# Patient Record
Sex: FEMALE | Race: WHITE | NOT HISPANIC OR LATINO | Employment: OTHER | URBAN - METROPOLITAN AREA
[De-identification: names, ages, dates, MRNs, and addresses within clinical notes are randomized per-mention and may not be internally consistent; named-entity substitution may affect disease eponyms.]

---

## 2020-10-30 ENCOUNTER — HOSPITAL ENCOUNTER (OUTPATIENT)
Dept: GASTROENTEROLOGY | Facility: HOSPITAL | Age: 65
Setting detail: OUTPATIENT SURGERY
Discharge: HOME | End: 2020-10-30
Attending: COLON & RECTAL SURGERY

## 2022-04-09 ENCOUNTER — APPOINTMENT (EMERGENCY)
Dept: RADIOLOGY | Facility: HOSPITAL | Age: 67
End: 2022-04-09
Payer: COMMERCIAL

## 2022-04-09 ENCOUNTER — HOSPITAL ENCOUNTER (EMERGENCY)
Facility: HOSPITAL | Age: 67
Discharge: LEFT BEFORE DISCHARGE | End: 2022-04-09
Attending: EMERGENCY MEDICINE | Admitting: EMERGENCY MEDICINE
Payer: COMMERCIAL

## 2022-04-09 VITALS
OXYGEN SATURATION: 93 % | RESPIRATION RATE: 23 BRPM | DIASTOLIC BLOOD PRESSURE: 82 MMHG | HEART RATE: 62 BPM | TEMPERATURE: 98.8 F | WEIGHT: 142 LBS | SYSTOLIC BLOOD PRESSURE: 171 MMHG

## 2022-04-09 DIAGNOSIS — R51.9 HEADACHE: ICD-10-CM

## 2022-04-09 DIAGNOSIS — R11.0 NAUSEA: Primary | ICD-10-CM

## 2022-04-09 DIAGNOSIS — R09.02 HYPOXIA: ICD-10-CM

## 2022-04-09 DIAGNOSIS — Z53.29 LEFT AGAINST MEDICAL ADVICE: ICD-10-CM

## 2022-04-09 DIAGNOSIS — J18.9 PNEUMONIA: ICD-10-CM

## 2022-04-09 LAB
ALBUMIN SERPL BCP-MCNC: 3.7 G/DL (ref 3.5–5)
ALP SERPL-CCNC: 93 U/L (ref 46–116)
ALT SERPL W P-5'-P-CCNC: 13 U/L (ref 12–78)
ANION GAP SERPL CALCULATED.3IONS-SCNC: 4 MMOL/L (ref 4–13)
AST SERPL W P-5'-P-CCNC: 15 U/L (ref 5–45)
ATRIAL RATE: 65 BPM
BACTERIA UR QL AUTO: NORMAL /HPF
BASOPHILS # BLD MANUAL: 0.13 THOUSAND/UL (ref 0–0.1)
BASOPHILS NFR MAR MANUAL: 1 % (ref 0–1)
BILIRUB SERPL-MCNC: 0.38 MG/DL (ref 0.2–1)
BILIRUB UR QL STRIP: NEGATIVE
BUN SERPL-MCNC: 28 MG/DL (ref 5–25)
CALCIUM SERPL-MCNC: 9.3 MG/DL (ref 8.3–10.1)
CHLORIDE SERPL-SCNC: 104 MMOL/L (ref 100–108)
CLARITY UR: CLEAR
CO2 SERPL-SCNC: 28 MMOL/L (ref 21–32)
COLOR UR: YELLOW
CREAT SERPL-MCNC: 1.56 MG/DL (ref 0.6–1.3)
D DIMER PPP FEU-MCNC: 2.97 UG/ML FEU
EOSINOPHIL # BLD MANUAL: 0 THOUSAND/UL (ref 0–0.4)
EOSINOPHIL NFR BLD MANUAL: 0 % (ref 0–6)
ERYTHROCYTE [DISTWIDTH] IN BLOOD BY AUTOMATED COUNT: 15 % (ref 11.6–15.1)
FLUAV RNA RESP QL NAA+PROBE: NEGATIVE
FLUBV RNA RESP QL NAA+PROBE: NEGATIVE
GFR SERPL CREATININE-BSD FRML MDRD: 33 ML/MIN/1.73SQ M
GLUCOSE SERPL-MCNC: 132 MG/DL (ref 65–140)
GLUCOSE UR STRIP-MCNC: NEGATIVE MG/DL
HCT VFR BLD AUTO: 46 % (ref 34.8–46.1)
HGB BLD-MCNC: 15.1 G/DL (ref 11.5–15.4)
HGB UR QL STRIP.AUTO: ABNORMAL
KETONES UR STRIP-MCNC: NEGATIVE MG/DL
LEUKOCYTE ESTERASE UR QL STRIP: NEGATIVE
LG PLATELETS BLD QL SMEAR: PRESENT
LIPASE SERPL-CCNC: 333 U/L (ref 73–393)
LYMPHOCYTES # BLD AUTO: 28 % (ref 14–44)
LYMPHOCYTES # BLD AUTO: 3.6 THOUSAND/UL (ref 0.6–4.47)
MCH RBC QN AUTO: 32.1 PG (ref 26.8–34.3)
MCHC RBC AUTO-ENTMCNC: 32.8 G/DL (ref 31.4–37.4)
MCV RBC AUTO: 98 FL (ref 82–98)
MONOCYTES # BLD AUTO: 0.51 THOUSAND/UL (ref 0–1.22)
MONOCYTES NFR BLD: 4 % (ref 4–12)
NEUTROPHILS # BLD MANUAL: 8.61 THOUSAND/UL (ref 1.85–7.62)
NEUTS BAND NFR BLD MANUAL: 1 % (ref 0–8)
NEUTS SEG NFR BLD AUTO: 66 % (ref 43–75)
NITRITE UR QL STRIP: NEGATIVE
NON-SQ EPI CELLS URNS QL MICRO: NORMAL /HPF
NT-PROBNP SERPL-MCNC: 2188 PG/ML
P AXIS: 85 DEGREES
PH UR STRIP.AUTO: 5.5 [PH] (ref 4.5–8)
PLATELET # BLD AUTO: 196 THOUSANDS/UL (ref 149–390)
PLATELET BLD QL SMEAR: ADEQUATE
PMV BLD AUTO: 10.9 FL (ref 8.9–12.7)
POIKILOCYTOSIS BLD QL SMEAR: PRESENT
POTASSIUM SERPL-SCNC: 4.8 MMOL/L (ref 3.5–5.3)
PR INTERVAL: 180 MS
PROT SERPL-MCNC: 8.1 G/DL (ref 6.4–8.2)
PROT UR STRIP-MCNC: ABNORMAL MG/DL
QRS AXIS: 75 DEGREES
QRSD INTERVAL: 80 MS
QT INTERVAL: 388 MS
QTC INTERVAL: 403 MS
RBC # BLD AUTO: 4.7 MILLION/UL (ref 3.81–5.12)
RBC #/AREA URNS AUTO: NORMAL /HPF
RSV RNA RESP QL NAA+PROBE: NEGATIVE
SARS-COV-2 RNA RESP QL NAA+PROBE: NEGATIVE
SODIUM SERPL-SCNC: 136 MMOL/L (ref 136–145)
SP GR UR STRIP.AUTO: >=1.03 (ref 1–1.03)
T WAVE AXIS: 84 DEGREES
TARGETS BLD QL SMEAR: PRESENT
UROBILINOGEN UR QL STRIP.AUTO: 0.2 E.U./DL
VENTRICULAR RATE: 65 BPM
WBC # BLD AUTO: 12.85 THOUSAND/UL (ref 4.31–10.16)
WBC #/AREA URNS AUTO: NORMAL /HPF

## 2022-04-09 PROCEDURE — 83690 ASSAY OF LIPASE: CPT | Performed by: EMERGENCY MEDICINE

## 2022-04-09 PROCEDURE — 70450 CT HEAD/BRAIN W/O DYE: CPT

## 2022-04-09 PROCEDURE — 85007 BL SMEAR W/DIFF WBC COUNT: CPT | Performed by: EMERGENCY MEDICINE

## 2022-04-09 PROCEDURE — 0241U HB NFCT DS VIR RESP RNA 4 TRGT: CPT | Performed by: EMERGENCY MEDICINE

## 2022-04-09 PROCEDURE — 71250 CT THORAX DX C-: CPT

## 2022-04-09 PROCEDURE — 93010 ELECTROCARDIOGRAM REPORT: CPT | Performed by: INTERNAL MEDICINE

## 2022-04-09 PROCEDURE — 85027 COMPLETE CBC AUTOMATED: CPT | Performed by: EMERGENCY MEDICINE

## 2022-04-09 PROCEDURE — 99284 EMERGENCY DEPT VISIT MOD MDM: CPT | Performed by: EMERGENCY MEDICINE

## 2022-04-09 PROCEDURE — 74176 CT ABD & PELVIS W/O CONTRAST: CPT

## 2022-04-09 PROCEDURE — 96361 HYDRATE IV INFUSION ADD-ON: CPT

## 2022-04-09 PROCEDURE — 36415 COLL VENOUS BLD VENIPUNCTURE: CPT | Performed by: EMERGENCY MEDICINE

## 2022-04-09 PROCEDURE — 83880 ASSAY OF NATRIURETIC PEPTIDE: CPT | Performed by: EMERGENCY MEDICINE

## 2022-04-09 PROCEDURE — 85379 FIBRIN DEGRADATION QUANT: CPT | Performed by: EMERGENCY MEDICINE

## 2022-04-09 PROCEDURE — 96374 THER/PROPH/DIAG INJ IV PUSH: CPT

## 2022-04-09 PROCEDURE — 99284 EMERGENCY DEPT VISIT MOD MDM: CPT

## 2022-04-09 PROCEDURE — 81001 URINALYSIS AUTO W/SCOPE: CPT

## 2022-04-09 PROCEDURE — 71045 X-RAY EXAM CHEST 1 VIEW: CPT

## 2022-04-09 PROCEDURE — G1004 CDSM NDSC: HCPCS

## 2022-04-09 PROCEDURE — 93005 ELECTROCARDIOGRAM TRACING: CPT

## 2022-04-09 PROCEDURE — 80053 COMPREHEN METABOLIC PANEL: CPT | Performed by: EMERGENCY MEDICINE

## 2022-04-09 RX ORDER — ONDANSETRON 2 MG/ML
4 INJECTION INTRAMUSCULAR; INTRAVENOUS ONCE
Status: COMPLETED | OUTPATIENT
Start: 2022-04-09 | End: 2022-04-09

## 2022-04-09 RX ADMIN — ONDANSETRON 4 MG: 2 INJECTION INTRAMUSCULAR; INTRAVENOUS at 12:00

## 2022-04-09 RX ADMIN — SODIUM CHLORIDE 1000 ML: 0.9 INJECTION, SOLUTION INTRAVENOUS at 12:00

## 2022-04-09 NOTE — ED NOTES
Patient requesting IV out at this time  Dr Mary Omer aware and at patient beside        Barrett Horner RN  04/09/22 1780

## 2022-04-09 NOTE — ED PROVIDER NOTES
History  Chief Complaint   Patient presents with    Medical Problem     Per pt "I don't feel well"  Pt unable to describe symptoms  Per pt started last night  49-year-old female with history of PE on Eliquis who presents for evaluation of headache, vomiting, and abdominal pain  Patient is a poor historian and is unable to tell me when her symptoms started but she believes sometime last night  She notes that she has had a diffuse headache  She has had nausea with multiple episodes of nonbloody nonbilious vomiting, although she notes her emesis was mostly mucus  She complains of generalized abdominal pain as well  She denies any surgeries on her abdomen  She denies fevers, chest pain, shortness of breath  She has a chronic cough which is unchanged  She did not take any medications prior to arrival           None       History reviewed  No pertinent past medical history  History reviewed  No pertinent surgical history  History reviewed  No pertinent family history  I have reviewed and agree with the history as documented  E-Cigarette/Vaping     E-Cigarette/Vaping Substances     Social History     Tobacco Use    Smoking status: Current Every Day Smoker    Smokeless tobacco: Never Used   Substance Use Topics    Alcohol use: Not Currently    Drug use: Not Currently        Review of Systems   Constitutional: Negative for chills and fever  Eyes: Negative for visual disturbance  Respiratory: Positive for cough (Chronic)  Negative for chest tightness and shortness of breath  Cardiovascular: Negative for chest pain and leg swelling  Gastrointestinal: Positive for abdominal pain, nausea and vomiting  Negative for abdominal distention, blood in stool, constipation and diarrhea  Genitourinary: Negative for dysuria, flank pain, frequency and urgency  Musculoskeletal: Negative for back pain and gait problem  Skin: Negative for pallor and rash  Neurological: Positive for headaches  Negative for syncope, weakness and light-headedness  All other systems reviewed and are negative  Physical Exam  ED Triage Vitals [04/09/22 1104]   Temperature Pulse Respirations Blood Pressure SpO2   98 8 °F (37 1 °C) 72 (!) 24 (!) 171/82 (!) 87 %      Temp Source Heart Rate Source Patient Position - Orthostatic VS BP Location FiO2 (%)   Oral Monitor Lying Left arm --      Pain Score       10 - Worst Possible Pain             Orthostatic Vital Signs  Vitals:    04/09/22 1104 04/09/22 1315   BP: (!) 171/82    Pulse: 72 62   Patient Position - Orthostatic VS: Lying        Physical Exam  Vitals and nursing note reviewed  Constitutional:       Appearance: She is well-developed  She is not ill-appearing  Comments: Patient intermittently vomiting  HENT:      Head: Normocephalic and atraumatic  Nose: Nose normal       Mouth/Throat:      Mouth: Mucous membranes are dry  Pharynx: No oropharyngeal exudate or posterior oropharyngeal erythema  Eyes:      Extraocular Movements: Extraocular movements intact  Pupils: Pupils are equal, round, and reactive to light  Cardiovascular:      Rate and Rhythm: Normal rate and regular rhythm  Heart sounds: No murmur heard  No friction rub  No gallop  Pulmonary:      Effort: Pulmonary effort is normal       Breath sounds: Normal breath sounds  No wheezing, rhonchi or rales  Abdominal:      General: There is no distension  Palpations: Abdomen is soft  Tenderness: There is abdominal tenderness in the suprapubic area and left upper quadrant  There is no right CVA tenderness, left CVA tenderness, guarding or rebound  Musculoskeletal:         General: No swelling or tenderness  Normal range of motion  Cervical back: Normal range of motion and neck supple  Skin:     General: Skin is warm and dry  Coloration: Skin is not pale  Findings: No rash  Neurological:      General: No focal deficit present        Mental Status: She is alert and oriented to person, place, and time  Psychiatric:         Behavior: Behavior normal          ED Medications  Medications   sodium chloride 0 9 % bolus 1,000 mL (0 mL Intravenous Stopped 4/9/22 1300)   ondansetron (ZOFRAN) injection 4 mg (4 mg Intravenous Given 4/9/22 1200)       Diagnostic Studies  Results Reviewed     Procedure Component Value Units Date/Time    CBC and differential [[id number]]  (Abnormal) Collected: 04/09/22 1159    Lab Status: Final result Specimen: Blood from Arm, Left Updated: 04/09/22 1521     WBC 12 85 Thousand/uL      RBC 4 70 Million/uL      Hemoglobin 15 1 g/dL      Hematocrit 46 0 %      MCV 98 fL      MCH 32 1 pg      MCHC 32 8 g/dL      RDW 15 0 %      MPV 10 9 fL      Platelets 351 Thousands/uL     Narrative: This is an appended report  These results have been appended to a previously verified report  Manual Differential(PHLEBS Do Not Order) [[id number]]  (Abnormal) Collected: 04/09/22 1159    Lab Status: Final result Specimen: Blood from Arm, Left Updated: 04/09/22 1521     Segmented % 66 %      Bands % 1 %      Lymphocytes % 28 %      Monocytes % 4 %      Eosinophils, % 0 %      Basophils % 1 %      Absolute Neutrophils 8 61 Thousand/uL      Lymphocytes Absolute 3 60 Thousand/uL      Monocytes Absolute 0 51 Thousand/uL      Eosinophils Absolute 0 00 Thousand/uL      Basophils Absolute 0 13 Thousand/uL      Total Counted --     Poikilocytes Present     Target Cells Present     Platelet Estimate Adequate     Large Platelet Present    COVID/FLU/RSV - 2 hour TAT [[id number]]  (Normal) Collected: 04/09/22 1159    Lab Status: Final result Specimen: Nares from Nose Updated: 04/09/22 1256     SARS-CoV-2 Negative     INFLUENZA A PCR Negative     INFLUENZA B PCR Negative     RSV PCR Negative    Narrative:      FOR PEDIATRIC PATIENTS - copy/paste COVID Guidelines URL to browser: [email or web address]    SARS-CoV-2 assay is a Nucleic Acid Amplification assay intended for the  qualitative detection of nucleic acid from SARS-CoV-2 in nasopharyngeal  swabs  Results are for the presumptive identification of SARS-CoV-2 RNA  Positive results are indicative of infection with SARS-CoV-2, the virus  causing COVID-19, but do not rule out bacterial infection or co-infection  with other viruses  Laboratories within the [place] and its  territories are required to report all positive results to the appropriate  public health authorities  Negative results do not preclude SARS-CoV-2  infection and should not be used as the sole basis for treatment or other  patient management decisions  Negative results must be combined with  clinical observations, patient history, and epidemiological information  This test has not been FDA cleared or approved  This test has been authorized by FDA under an Emergency Use Authorization  (EUA)  This test is only authorized for the duration of time the  declaration that circumstances exist justifying the authorization of the  emergency use of an in vitro diagnostic tests for detection of SARS-CoV-2  virus and/or diagnosis of COVID-19 infection under section 564(b)(1) of  the Act, 21 U  S C  281MRA-2(O)(1), unless the authorization is terminated  or revoked sooner  The test has been validated but independent review by FDA  and CLIA is pending  Test performed using [employer]: This RT-PCR assay targets N2,  a region unique to SARS-CoV-2  A conserved region in the E-gene was chosen  for pan-Sarbecovirus detection which includes SARS-CoV-2      Urine Microscopic [[id number]]  (Normal) Collected: 04/09/22 1213    Lab Status: Final result Specimen: Urine, Clean Catch Updated: 04/09/22 1251     RBC, UA 1-2 /hpf      WBC, UA 1-2 /hpf      Epithelial Cells Occasional /hpf      Bacteria, UA None Seen /hpf     D-Dimer [[id number]]  (Abnormal) Collected: 04/09/22 1159    Lab Status: Final result Specimen: Blood from Arm, Left Updated: 04/09/22 1236     D-Dimer, Quant 2 97 ug/ml FEU     Narrative: In the evaluation for possible pulmonary embolism, in the appropriate (Well's Score of 4 or less) patient, the age adjusted d-dimer cutoff for this patient can be calculated as:    Age x 0 01 (in ug/mL) for Age-adjusted D-dimer exclusion threshold for a patient over 50 years      NT-BNP PRO [[id number]]  (Abnormal) Collected: 04/09/22 1159    Lab Status: Final result Specimen: Blood from Arm, Left Updated: 04/09/22 1229     NT-proBNP 2,188 pg/mL     Comprehensive metabolic panel [[id number]]  (Abnormal) Collected: 04/09/22 1159    Lab Status: Final result Specimen: Blood from Arm, Left Updated: 04/09/22 1228     Sodium 136 mmol/L      Potassium 4 8 mmol/L      Chloride 104 mmol/L      CO2 28 mmol/L      ANION GAP 4 mmol/L      BUN 28 mg/dL      Creatinine 1 56 mg/dL      Glucose 132 mg/dL      Calcium 9 3 mg/dL      AST 15 U/L      ALT 13 U/L      Alkaline Phosphatase 93 U/L      Total Protein 8 1 g/dL      Albumin 3 7 g/dL      Total Bilirubin 0 38 mg/dL      eGFR 33 ml/min/1 73sq m     Narrative:      [place] guidelines for Chronic Kidney Disease (CKD):     Stage 1 with normal or high GFR (GFR > 90 mL/min/1 73 square meters)    Stage 2 Mild CKD (GFR = 60-89 mL/min/1 73 square meters)    Stage 3A Moderate CKD (GFR = 45-59 mL/min/1 73 square meters)    Stage 3B Moderate CKD (GFR = 30-44 mL/min/1 73 square meters)    Stage 4 Severe CKD (GFR = 15-29 mL/min/1 73 square meters)    Stage 5 End Stage CKD (GFR <15 mL/min/1 73 square meters)  Note: GFR calculation is accurate only with a steady state creatinine    Lipase [[id number]]  (Normal) Collected: 04/09/22 1159    Lab Status: Final result Specimen: Blood from Arm, Left Updated: 04/09/22 1228     Lipase 333 u/L     Urine Macroscopic, POC [[id number]]  (Abnormal) Collected: 04/09/22 1213    Lab Status: Final result Specimen: Urine Updated: 04/09/22 1215     Color, UA Yellow     Clarity, UA Clear     pH, UA 5 5     Leukocytes, UA Negative     Nitrite, UA Negative     Protein,  (2+) mg/dl      Glucose, UA Negative mg/dl      Ketones, UA Negative mg/dl      Urobilinogen, UA 0 2 E U /dl      Bilirubin, UA Negative     Blood, UA Small     Specific [place], UA >=1 030    Narrative:      CLINITEK RESULT                 CT chest abdomen pelvis wo contrast   Final Result by Huy Allison MD (04/09 1610)      1  Pulmonary emphysematous changes with scattered parenchymal densities noted, the findings at the lung bases most consistent with subsegmental atelectasis and/or scarring  Somewhat more confluent groundglass opacity noted within the lingula suspicious    for pneumonia, and coarse irregular predominantly linear opacities in the right upper lobe, indeterminate  Follow-up noncontrast CT of the chest is recommended in 3 months   2  Diffuse atherosclerotic changes identified, and 3 2 cm abdominal aortic aneurysm  3  Atrophic left kidney, and right kidney lower pole cortical thinning/scarring with upper pole nonobstructing 3 mm calculus   4  Age-indeterminate mild compression deformity of L1   5 * Possible irregular wall thickening of the rectosigmoid versus transient muscle contraction  A rectosigmoid lesion is however not excluded  Direct visualization/ nonemergent GI consultation is recommended  The examination demonstrates a significant  finding and was documented as such in [facility] for liaison and referring practitioner immediate notification  The examination demonstrates a finding requiring imaging follow-up and was logged as such in [address] [place] [address] Rd  Workstation performed: [id number]         CT head without contrast   Final Result by Dorie Smith MD (04/09 1213)      No acute intracranial abnormality                    Workstation performed: [id number]         XR chest 1 view portable   Final Result by Huy Allison MD (04/09 1531)      Hazy airspace opacity in the lateral right lower lung field, concerning for pneumonia  A CT of the chest is pending                  Workstation performed: [id number]               Procedures  Procedures      ED Course  ED Course as of 04/10/22 1353   Sat Apr 09, 2022   1422 Patient refusing CTPE study  Now reporting that she "had a bad reaction" the last time she received IV dye  When asked what this reaction was, she could not remember  I asked patient if her face got swollen or she couldn't breathe when this happened and she states yes  Advised patient that her O2 saturations are low, down to a low of 77%, however, patient is refusing to wear oxygen  Stating "this is bullshit" and ripped off her pulse ox  Refusing to wear pulse ox at this time  I offered patient options regarding CT including not obtaining imaging, CT without contrast, and CT with prep  Risks and benefits of each discussed  Pt stating that she texted the options to her nephew and will let us know what he says  Refusing to call him so I can speak with him directly  5 Again discussed options regarding CT with patient  She refuses IV contrast even with prep but wants a CT without contrast  Advised patient that given her hypoxia I would recommend that she be admitted to the hospital even after her CT scan  Patient stating "well I don't have to if I don't want to" however she continues to want the CT performed  SBIRT 20yo+      Most Recent Value   SBIRT (22 yo +)    In order to provide better care to our patients, we are screening all of our patients for alcohol and drug use  Would it be okay to ask you these screening questions?  No Filed at: 04/09/2022 1208          Wells' Criteria for PE      Most Recent Value   Wells' Criteria for PE    Clinical signs and symptoms of DVT 0 Filed at: 04/09/2022 1238   PE is primary diagnosis or equally likely 0 Filed at: 04/09/2022 1238   HR >100 0 Filed at: 04/09/2022 1238 Immobilization at least 3 days or Surgery in the previous 4 weeks 0 Filed at: 04/09/2022 1238   Previous, objectively diagnosed PE or DVT 0 Filed at: 04/09/2022 1238   Hemoptysis 0 Filed at: 04/09/2022 1238   Malignancy with treatment within 6 months or palliative 0 Filed at: 04/09/2022 1238   Wells' Criteria Total 0 Filed at: 04/09/2022 1238            [facility]  Number of Diagnoses or Management Options  Headache: new and requires workup  Nausea: new and requires workup  Diagnosis management comments: 53-year-old female who presents for evaluation of headache, nausea, abdominal pain  Patient poor historian and unable to provide a timeline of history  Will obtain labs, UA, D-dimer, CT head  Workup showing elevated D-dimer, attempted to order PE study, however, patient stating that she has a severe allergy  Offered premedication however patient declined and is requesting CT without contrast   Patient hypoxic but refusing to wear supplemental oxygen  Signed out to Dr Issa Figueroa pending CT  On chart review, patient eloped prior to discussion of results         Amount and/or Complexity of Data Reviewed  Clinical lab tests: ordered and reviewed  Tests in the radiology section of CPT®: ordered and reviewed  Review and summarize past medical records: yes    Risk of Complications, Morbidity, and/or Mortality  Presenting problems: high  Diagnostic procedures: low  Management options: moderate    Patient Progress  Patient progress: stable      Disposition  Final diagnoses:   Nausea   Headache     Time reflects when diagnosis was documented in both MDM as applicable and the Disposition within this note     Time User Action Codes Description Comment    4/9/2022  5:19 PM Orvan Vance Add [R11 0] Nausea     4/9/2022  5:19 PM Orvan Vance Add [R51 9] Headache       ED Disposition     ED Disposition Condition Date/Time Comment    Left from Room after Provider Exam  Sat Apr 9, 2022  5:19 PM Patient left after provider exam, after CT but was not able to be told about CT read because the patient left before I could discuss the results with her  Follow-up Information    None         There are no discharge medications for this patient  No discharge procedures on file  PDMP Review     None           ED Provider  Attending physically available and evaluated Kira Swapnil Amayaantoinettelinda  I managed the patient along with the ED Attending      Electronically Signed by         Charla Collins MD  04/10/22 8537

## 2022-04-09 NOTE — ED NOTES
Pt removed nasal cannula  pts spo2 80%  Pt refusing to wear oxygen stating "No I wont wear it   Its that simple "     Celena Arana, RN  04/09/22 6407

## 2022-04-09 NOTE — ED ATTENDING ATTESTATION
Final Diagnosis:  1  Nausea    2  Headache      ED Course as of 04/10/22 1845   Sat Apr 09, 2022   1307 D-Dimer, Quant(!): 2 97   1307 NT-proBNP(!): 2,188   1307 Creatinine(!): 1 56   1307 WBC(!): 12 85   1307 Hemoglobin: 15 1   1307 Platelet Count: 015   1539 Bands Relative: 1       I, Jeffrey Frazier MD, saw and evaluated the patient  All available labs and X-rays were ordered by me or the resident and have been reviewed by myself  I discussed the patient with the resident / non-physician and agree with the resident's / non-physician practitioner's findings and plan as documented in the resident's / non-physician practicitioner's note, except where noted  At this point, I agree with the current assessment done in the ED  I was present during key portions of all procedures performed unless otherwise stated  Chief Complaint   Patient presents with    Medical Problem     Per pt "I don't feel well"  Pt unable to describe symptoms  Per pt started last night  This is a 74 y o  female presenting for evaluation of nausea  She is not normally on oxygen, saturating 87% currently with lots of coughing  She had a headache at some point, doesn't remember much  Has posterior neck pain  While providing history she keeps spitting up but denying nausea? ? Hx is incredibly difficult to ascertain from the patient  She's on methadone and trazadone? PMH:   has no past medical history on file  PSH:   has no past surgical history on file      Social:  Social History     Substance and Sexual Activity   Alcohol Use Not Currently     Social History     Tobacco Use   Smoking Status Current Every Day Smoker   Smokeless Tobacco Never Used     Social History     Substance and Sexual Activity   Drug Use Not Currently     PE:  Vitals:    04/09/22 1104 04/09/22 1114 04/09/22 1315   BP: (!) 171/82     BP Location: Left arm     Pulse: 72  62   Resp: (!) 24  (!) 23   Temp: 98 8 °F (37 1 °C)     TempSrc: Oral     SpO2: (!) 87% 94% 93%   Weight: 64 4 kg (142 lb)     General: VSS, NAD, awake, alert  Well-nourished, well-developed  Appears stated age  Head: Normocephalic, atraumatic, nontender  Eyes: PERRL, EOM-I  No diplopia  No hyphema  No subconjunctival hemorrhages  Symmetrical lids  ENTAtraumatic external nose and ears  MMM  No stridor  Normal phonation  No drooling  Base of mouth is soft  No mastoid tenderness  Neck: Symmetric, trachea midline  No JVD  CV: Peripheral pulses +2 throughout  No chest wall tenderness  Lungs:   Unlabored   No retractions  No crepitus  No tachypnea  No paradoxical motion  Abd: +BS, soft, NT/ND    MSK:   FROM   No lower extremity edema  Back:   No CVAT  Skin: Dry, intact  Neuro: AAOx3, GCS 15, CN II-XII grossly intact  Motor grossly intact  Psychiatric/Behavioral: Appropriate mood and affect   Exam: deferred  A:  - hypoxia  P:  - cardiac wrokup  - CXR  - r/o PE    - 13 point ROS was performed and all are normal unless stated in the history above  - Nursing note reviewed  Vitals reviewed  - Orders placed by myself and/or advanced practitioner / resident     - Previous chart was reviewed  - No language barrier    - History obtained from patient  - There are no limitations to the history obtained  - Critical care time: 34 minutes  - Critical care time was exclusive of seperately bilable procedures and treating other patients as well as teaching time     - Critical care was necessary to treat or prevent imminent or life-threatening deterioration of the following condition: hypoxia    - Critical care time was spent personally by me on the following activities as well as the above as per the ED course and rest of chart: blood draw for specimens, obtaining history from patient / surrogate, developement of a treatment plan, discussions with consultants, evaluation of patient's response to the treatment, examination of the patient, ordering/performing treatements and interventions, re-evaluation of the patient's condition, review of old charts, ordering/reviewing laboratory studies, ordering/reviewing of radiographic studies    Code Status: No Order  Advance Directive and Living Will:      Power of :    POLST:      Medications   sodium chloride 0 9 % bolus 1,000 mL (0 mL Intravenous Stopped 4/9/22 1300)   ondansetron (ZOFRAN) injection 4 mg (4 mg Intravenous Given 4/9/22 1200)     CT chest abdomen pelvis wo contrast   Final Result      1  Pulmonary emphysematous changes with scattered parenchymal densities noted, the findings at the lung bases most consistent with subsegmental atelectasis and/or scarring  Somewhat more confluent groundglass opacity noted within the lingula suspicious    for pneumonia, and coarse irregular predominantly linear opacities in the right upper lobe, indeterminate  Follow-up noncontrast CT of the chest is recommended in 3 months   2  Diffuse atherosclerotic changes identified, and 3 2 cm abdominal aortic aneurysm  3  Atrophic left kidney, and right kidney lower pole cortical thinning/scarring with upper pole nonobstructing 3 mm calculus   4  Age-indeterminate mild compression deformity of L1   5 * Possible irregular wall thickening of the rectosigmoid versus transient muscle contraction  A rectosigmoid lesion is however not excluded  Direct visualization/ nonemergent GI consultation is recommended  The examination demonstrates a significant  finding and was documented as such in [facility] for liaison and referring practitioner immediate notification  The examination demonstrates a finding requiring imaging follow-up and was logged as such in [address] [place] [address] Rd  Workstation performed: [id number]         CT head without contrast   Final Result      No acute intracranial abnormality                    Workstation performed: [id number]         XR chest 1 view portable   Final Result      Hazy airspace opacity in the lateral right lower lung field, concerning for pneumonia  A CT of the chest is pending                  Workstation performed: [id number]           Orders Placed This Encounter   Procedures    COVID/FLU/RSV - 2 hour TAT    XR chest 1 view portable    CT head without contrast    CT chest abdomen pelvis wo contrast    CBC and differential    Comprehensive metabolic panel    NT-BNP PRO    Lipase    D-Dimer    Urine Microscopic    ECG 12 lead    ECG 12 lead     Labs Reviewed   CBC AND DIFFERENTIAL - Abnormal       Result Value Ref Range Status    WBC 12 85 (*) 4 31 - 10 16 Thousand/uL Final    RBC 4 70  3 81 - 5 12 Million/uL Final    Hemoglobin 15 1  11 5 - 15 4 g/dL Final    Hematocrit 46 0  34 8 - 46 1 % Final    MCV 98  82 - 98 fL Final    MCH 32 1  26 8 - 34 3 pg Final    MCHC 32 8  31 4 - 37 4 g/dL Final    RDW 15 0  11 6 - 15 1 % Final    MPV 10 9  8 9 - 12 7 fL Final    Platelets 202  332 - 390 Thousands/uL Final    Narrative: This is an appended report  These results have been appended to a previously verified report  COMPREHENSIVE METABOLIC PANEL - Abnormal    Sodium 136  136 - 145 mmol/L Final    Potassium 4 8  3 5 - 5 3 mmol/L Final    Chloride 104  100 - 108 mmol/L Final    CO2 28  21 - 32 mmol/L Final    ANION GAP 4  4 - 13 mmol/L Final    BUN 28 (*) 5 - 25 mg/dL Final    Creatinine 1 56 (*) 0 60 - 1 30 mg/dL Final    Comment: Standardized to IDMS reference method    Glucose 132  65 - 140 mg/dL Final    Comment: If the patient is fasting, the ADA then defines impaired fasting glucose as > 100 mg/dL and diabetes as > or equal to 123 mg/dL  Specimen collection should occur prior to Sulfasalazine administration due to the potential for falsely depressed results  Specimen collection should occur prior to Sulfapyridine administration due to the potential for falsely elevated results      Calcium 9 3  8 3 - 10 1 mg/dL Final    AST 15  5 - 45 U/L Final    Comment: Specimen collection should occur prior to Sulfasalazine administration due to the potential for falsely depressed results  ALT 13  12 - 78 U/L Final    Comment: Specimen collection should occur prior to Sulfasalazine and/or Sulfapyridine administration due to the potential for falsely depressed results  Alkaline Phosphatase 93  46 - 116 U/L Final    Total Protein 8 1  6 4 - 8 2 g/dL Final    Albumin 3 7  3 5 - 5 0 g/dL Final    Total Bilirubin 0 38  0 20 - 1 00 mg/dL Final    Comment: Use of this assay is not recommended for patients undergoing treatment with eltrombopag due to the potential for falsely elevated results  eGFR 33  ml/min/1 73sq m Final    Narrative:     [place] guidelines for Chronic Kidney Disease (CKD):     Stage 1 with normal or high GFR (GFR > 90 mL/min/1 73 square meters)    Stage 2 Mild CKD (GFR = 60-89 mL/min/1 73 square meters)    Stage 3A Moderate CKD (GFR = 45-59 mL/min/1 73 square meters)    Stage 3B Moderate CKD (GFR = 30-44 mL/min/1 73 square meters)    Stage 4 Severe CKD (GFR = 15-29 mL/min/1 73 square meters)    Stage 5 End Stage CKD (GFR <15 mL/min/1 73 square meters)  Note: GFR calculation is accurate only with a steady state creatinine   NT-BNP PRO (BRAIN NATRIURETIC PEPTIDE) - Abnormal    NT-proBNP 2,188 (*) <125 pg/mL Final   D-DIMER, QUANTITATIVE - Abnormal    D-Dimer, Quant 2 97 (*) <0 50 ug/ml FEU Final    Comment: Reference and upper limits to exclude DVT and PE are the same  Do not use to exclude if clinical symptoms are present  Pregnant women:  1st trimester:  <0 22 - 1 06 ug/ml FEU  2nd trimester:  <0 22 - 1 88 ug/ml FEU  3rd trimester:   0 24 - 3 28 ug/ml FEU    Note: Normal ranges may not apply to patients who are transgender, non-binary, or whose legal sex, sex at birth, and gender identity differ  Narrative:      In the evaluation for possible pulmonary embolism, in the appropriate (Well's Score of 4 or less) patient, the age adjusted d-dimer cutoff for this patient can be calculated as:    Age x 0 01 (in ug/mL) for Age-adjusted D-dimer exclusion threshold for a patient over 50 years  URINE MACROSCOPIC, POC - Abnormal    Color, UA Yellow   Final    Clarity, UA Clear   Final    pH, UA 5 5  4 5 - 8 0 Final    Leukocytes, UA Negative  Negative Final    Nitrite, UA Negative  Negative Final    Protein,  (2+) (*) Negative mg/dl Final    Glucose, UA Negative  Negative mg/dl Final    Ketones, UA Negative  Negative mg/dl Final    Urobilinogen, UA 0 2  0 2, 1 0 E U /dl E U /dl Final    Bilirubin, UA Negative  Negative Final    Blood, UA Small (*) Negative Final    Specific Gravity, UA >=1 030  1 003 - 1 030 Final    Narrative:     CLINITEK RESULT   MANUAL DIFFERENTIAL(PHLEBS DO NOT ORDER) - Abnormal    Segmented % 66  43 - 75 % Final    Bands % 1  0 - 8 % Final    Lymphocytes % 28  14 - 44 % Final    Monocytes % 4  4 - 12 % Final    Eosinophils, % 0  0 - 6 % Final    Basophils % 1  0 - 1 % Final    Absolute Neutrophils 8 61 (*) 1 85 - 7 62 Thousand/uL Final    Lymphocytes Absolute 3 60  0 60 - 4 47 Thousand/uL Final    Monocytes Absolute 0 51  0 00 - 1 22 Thousand/uL Final    Eosinophils Absolute 0 00  0 00 - 0 40 Thousand/uL Final    Basophils Absolute 0 13 (*) 0 00 - 0 10 Thousand/uL Final    Total Counted     Final    Poikilocytes Present   Final    Target Cells Present   Final    Platelet Estimate Adequate  Adequate Final    Large Platelet Present   Final   COVID19, INFLUENZA A/B, RSV PCR, [facility] - Normal    SARS-CoV-2 Negative  Negative Final    Comment:      INFLUENZA A PCR Negative  Negative Final    Comment:      INFLUENZA B PCR Negative  Negative Final    Comment:      RSV PCR Negative  Negative Final    Comment:      Narrative:     FOR PEDIATRIC PATIENTS - copy/paste COVID Guidelines URL to browser: [email or web address]/  ashx    SARS-CoV-2 assay is a Nucleic Acid Amplification assay intended for the  qualitative detection of nucleic acid from SARS-CoV-2 in nasopharyngeal  swabs  Results are for the presumptive identification of SARS-CoV-2 RNA  Positive results are indicative of infection with SARS-CoV-2, the virus  causing COVID-19, but do not rule out bacterial infection or co-infection  with other viruses  Laboratories within the [place] and its  territories are required to report all positive results to the appropriate  public health authorities  Negative results do not preclude SARS-CoV-2  infection and should not be used as the sole basis for treatment or other  patient management decisions  Negative results must be combined with  clinical observations, patient history, and epidemiological information  This test has not been FDA cleared or approved  This test has been authorized by FDA under an Emergency Use Authorization  (EUA)  This test is only authorized for the duration of time the  declaration that circumstances exist justifying the authorization of the  emergency use of an in vitro diagnostic tests for detection of SARS-CoV-2  virus and/or diagnosis of COVID-19 infection under section 564(b)(1) of  the Act, 21 U  S C  247GCM-1(P)(3), unless the authorization is terminated  or revoked sooner  The test has been validated but independent review by FDA  and CLIA is pending  Test performed using [employer] GeneXpert: This RT-PCR assay targets N2,  a region unique to SARS-CoV-2  A conserved region in the E-gene was chosen  for pan-Sarbecovirus detection which includes SARS-CoV-2     LIPASE - Normal    Lipase 333  73 - 393 u/L Final   URINE MICROSCOPIC - Normal    RBC, UA 1-2  None Seen, 1-2 /hpf Final    WBC, UA 1-2  None Seen, 1-2 /hpf Final    Epithelial Cells Occasional  None Seen, Occasional /hpf Final    Bacteria, UA None Seen  None Seen, Occasional /hpf Final     Time reflects when diagnosis was documented in both MDM as applicable and the Disposition within this note       Time User Action Codes Description Comment    4/9/2022  5:19 PM Kvng Amato Add [R11 0] Nausea     4/9/2022  5:19 PM Kvng Amato Add [R51 9] Headache           ED Disposition       ED Disposition Condition Date/Time Comment    Left from Room after Provider Exam  Sat Apr 9, 2022  5:19 PM Patient left after provider exam, after CT but was not able to be told about CT read because the patient left before I could discuss the results with her  Follow-up Information    None       There are no discharge medications for this patient  No discharge procedures on file  None       Portions of the record may have been created with voice recognition software  Occasional wrong word or "sound a like" substitutions may have occurred due to the inherent limitations of voice recognition software  Read the chart carefully and recognize, using context, where substitutions have occurred      Electronically signed by:  Jameel Nye

## 2022-04-09 NOTE — ED NOTES
Pt pulse ox 87% on room air pt refusing oxygen at 2 Liters requesting it be down to 1 liter       Kevin Dunaway, RN  04/09/22 189 Michelle Castrejon RN  04/09/22 2957

## 2022-04-09 NOTE — ED NOTES
Patient refusing anything more than 1L O2 at this time  O2 sat 89% on 1L currently  Dr Jun Orona aware        Eli Wolfe, RN  04/09/22 1269

## 2022-04-09 NOTE — ED NOTES
Patient ringing call bell asking for IV to be removed  Explained that physician would be in to discuss results shortly but he is currently seeing a critical patient  Patient states she must leave at 1630 as her ride is coming  Offered to arrange ride for patient later but asked for her to please wait for the doctor  Patient states she will wait five minutes       Nidia Ortiz RN  04/09/22 7513

## 2022-04-09 NOTE — ED NOTES
Went into room due to pulse ox reading 77%  Pt lying in bed  Attempted to provide pt with oxygen pt refusing oxygen and pulse oximetry monitoring  MD Garcia at bedside during this interaction  MD ally Metcalf, RN  04/09/22 9135

## 2022-04-10 RX ORDER — AMOXICILLIN AND CLAVULANATE POTASSIUM 875; 125 MG/1; MG/1
1 TABLET, FILM COATED ORAL EVERY 12 HOURS
Qty: 20 TABLET | Refills: 0 | Status: SHIPPED | OUTPATIENT
Start: 2022-04-10 | End: 2022-04-20

## 2022-11-07 ENCOUNTER — TELEPHONE (OUTPATIENT)
Dept: OTHER | Facility: OTHER | Age: 67
End: 2022-11-07

## 2023-03-08 ENCOUNTER — HOSPITAL ENCOUNTER (OUTPATIENT)
Dept: RADIOLOGY | Facility: HOSPITAL | Age: 68
Discharge: HOME | End: 2023-03-08
Attending: FAMILY MEDICINE

## 2023-03-08 DIAGNOSIS — R52 PAIN: ICD-10-CM
